# Patient Record
Sex: MALE | Race: WHITE | NOT HISPANIC OR LATINO | URBAN - METROPOLITAN AREA
[De-identification: names, ages, dates, MRNs, and addresses within clinical notes are randomized per-mention and may not be internally consistent; named-entity substitution may affect disease eponyms.]

---

## 2022-01-12 ENCOUNTER — APPOINTMENT (RX ONLY)
Dept: URBAN - METROPOLITAN AREA CLINIC 332 | Facility: CLINIC | Age: 72
Setting detail: DERMATOLOGY
End: 2022-01-12

## 2022-01-12 DIAGNOSIS — D18.0 HEMANGIOMA: ICD-10-CM

## 2022-01-12 PROBLEM — D18.01 HEMANGIOMA OF SKIN AND SUBCUTANEOUS TISSUE: Status: ACTIVE | Noted: 2022-01-12

## 2022-01-12 PROCEDURE — ? ADDITIONAL NOTES

## 2022-01-12 PROCEDURE — ? BENIGN DESTRUCTION COSMETIC

## 2022-01-12 ASSESSMENT — LOCATION DETAILED DESCRIPTION DERM
LOCATION DETAILED: LEFT LATERAL ABDOMEN
LOCATION DETAILED: PERIUMBILICAL SKIN
LOCATION DETAILED: RIGHT RIB CAGE
LOCATION DETAILED: LEFT RIB CAGE

## 2022-01-12 ASSESSMENT — LOCATION SIMPLE DESCRIPTION DERM: LOCATION SIMPLE: ABDOMEN

## 2022-01-12 ASSESSMENT — LOCATION ZONE DERM: LOCATION ZONE: TRUNK

## 2022-01-12 NOTE — HPI: SKIN LESION (HEMANGIOMA(S))
Is This A New Presentation, Or A Follow-Up?: Follow Up Hemangioma(s)
Additional History: Her for cosmetic treatment of a angiomas